# Patient Record
Sex: FEMALE | Race: WHITE | NOT HISPANIC OR LATINO | Employment: FULL TIME | URBAN - METROPOLITAN AREA
[De-identification: names, ages, dates, MRNs, and addresses within clinical notes are randomized per-mention and may not be internally consistent; named-entity substitution may affect disease eponyms.]

---

## 2023-03-08 LAB
ALANINE AMINOTRANSFERASE (SGPT) (U/L) IN SER/PLAS: 7 U/L (ref 7–45)
ALBUMIN (G/DL) IN SER/PLAS: 3.9 G/DL (ref 3.4–5)
ALKALINE PHOSPHATASE (U/L) IN SER/PLAS: 52 U/L (ref 33–110)
ANION GAP IN SER/PLAS: 12 MMOL/L (ref 10–20)
APPEARANCE, URINE: CLEAR
ASPARTATE AMINOTRANSFERASE (SGOT) (U/L) IN SER/PLAS: 15 U/L (ref 9–39)
BILIRUBIN TOTAL (MG/DL) IN SER/PLAS: 0.7 MG/DL (ref 0–1.2)
BILIRUBIN, URINE: NEGATIVE
BLOOD, URINE: NEGATIVE
CALCIUM (MG/DL) IN SER/PLAS: 8.8 MG/DL (ref 8.6–10.6)
CARBON DIOXIDE, TOTAL (MMOL/L) IN SER/PLAS: 27 MMOL/L (ref 21–32)
CHLORIDE (MMOL/L) IN SER/PLAS: 103 MMOL/L (ref 98–107)
CHOLESTEROL (MG/DL) IN SER/PLAS: 210 MG/DL (ref 0–199)
CHOLESTEROL IN HDL (MG/DL) IN SER/PLAS: 57.7 MG/DL
CHOLESTEROL/HDL RATIO: 3.6
COLOR, URINE: YELLOW
CREATININE (MG/DL) IN SER/PLAS: 0.85 MG/DL (ref 0.5–1.05)
ERYTHROCYTE DISTRIBUTION WIDTH (RATIO) BY AUTOMATED COUNT: 14.6 % (ref 11.5–14.5)
ERYTHROCYTE MEAN CORPUSCULAR HEMOGLOBIN CONCENTRATION (G/DL) BY AUTOMATED: 31.4 G/DL (ref 32–36)
ERYTHROCYTE MEAN CORPUSCULAR VOLUME (FL) BY AUTOMATED COUNT: 83 FL (ref 80–100)
ERYTHROCYTES (10*6/UL) IN BLOOD BY AUTOMATED COUNT: 5.05 X10E12/L (ref 4–5.2)
GFR FEMALE: 83 ML/MIN/1.73M2
GLUCOSE (MG/DL) IN SER/PLAS: 88 MG/DL (ref 74–99)
GLUCOSE, URINE: NEGATIVE MG/DL
HEMATOCRIT (%) IN BLOOD BY AUTOMATED COUNT: 42 % (ref 36–46)
HEMOGLOBIN (G/DL) IN BLOOD: 13.2 G/DL (ref 12–16)
KETONES, URINE: NEGATIVE MG/DL
LDL: 135 MG/DL (ref 0–99)
LEUKOCYTE ESTERASE, URINE: ABNORMAL
LEUKOCYTES (10*3/UL) IN BLOOD BY AUTOMATED COUNT: 5 X10E9/L (ref 4.4–11.3)
NITRITE, URINE: NEGATIVE
NRBC (PER 100 WBCS) BY AUTOMATED COUNT: 0 /100 WBC (ref 0–0)
PH, URINE: 7 (ref 5–8)
PLATELETS (10*3/UL) IN BLOOD AUTOMATED COUNT: 253 X10E9/L (ref 150–450)
POTASSIUM (MMOL/L) IN SER/PLAS: 4.5 MMOL/L (ref 3.5–5.3)
PROTEIN TOTAL: 6.3 G/DL (ref 6.4–8.2)
PROTEIN, URINE: NEGATIVE MG/DL
RBC, URINE: 1 /HPF (ref 0–5)
SODIUM (MMOL/L) IN SER/PLAS: 137 MMOL/L (ref 136–145)
SPECIFIC GRAVITY, URINE: 1.01 (ref 1–1.03)
SQUAMOUS EPITHELIAL CELLS, URINE: 3 /HPF
THYROTROPIN (MIU/L) IN SER/PLAS BY DETECTION LIMIT <= 0.05 MIU/L: 1.13 MIU/L (ref 0.44–3.98)
TRIGLYCERIDE (MG/DL) IN SER/PLAS: 85 MG/DL (ref 0–149)
UREA NITROGEN (MG/DL) IN SER/PLAS: 11 MG/DL (ref 6–23)
UROBILINOGEN, URINE: <2 MG/DL (ref 0–1.9)
VLDL: 17 MG/DL (ref 0–40)
WBC, URINE: 1 /HPF (ref 0–5)

## 2023-03-13 DIAGNOSIS — F52.0 HYPOACTIVE SEXUAL DESIRE DISORDER: Primary | ICD-10-CM

## 2023-03-13 PROBLEM — R00.2 PALPITATIONS: Status: ACTIVE | Noted: 2023-03-13

## 2023-03-13 PROBLEM — B34.9 NONSPECIFIC SYNDROME SUGGESTIVE OF VIRAL ILLNESS: Status: ACTIVE | Noted: 2023-03-13

## 2023-03-13 PROBLEM — M75.50 SUBACROMIAL BURSITIS: Status: ACTIVE | Noted: 2023-03-13

## 2023-03-13 PROBLEM — R87.610 ASCUS OF CERVIX WITH NEGATIVE HIGH RISK HPV: Status: ACTIVE | Noted: 2023-03-13

## 2023-03-13 PROBLEM — R00.0 INCREASED HEART RATE: Status: ACTIVE | Noted: 2023-03-13

## 2023-03-13 PROBLEM — R79.89 ELEVATED SERUM CREATININE: Status: ACTIVE | Noted: 2023-03-13

## 2023-03-13 PROBLEM — J30.2 SEASONAL ALLERGIES: Status: ACTIVE | Noted: 2023-03-13

## 2023-03-13 PROBLEM — R05.8 DRY COUGH: Status: ACTIVE | Noted: 2023-03-13

## 2023-03-13 PROBLEM — L50.9 URTICARIA: Status: ACTIVE | Noted: 2023-03-13

## 2023-03-13 PROBLEM — G47.9 SLEEP DISTURBANCE: Status: ACTIVE | Noted: 2023-03-13

## 2023-03-13 PROBLEM — R11.0 NAUSEA IN ADULT: Status: ACTIVE | Noted: 2023-03-13

## 2023-03-13 RX ORDER — VENLAFAXINE HYDROCHLORIDE 37.5 MG/1
37.5 CAPSULE, EXTENDED RELEASE ORAL DAILY
Qty: 90 CAPSULE | Refills: 1 | Status: SHIPPED | OUTPATIENT
Start: 2023-03-13 | End: 2024-03-05 | Stop reason: WASHOUT

## 2023-03-13 RX ORDER — VENLAFAXINE HYDROCHLORIDE 37.5 MG/1
37.5 CAPSULE, EXTENDED RELEASE ORAL DAILY
COMMUNITY
End: 2023-03-13 | Stop reason: SDUPTHER

## 2023-03-14 ENCOUNTER — TELEPHONE (OUTPATIENT)
Dept: PRIMARY CARE | Facility: CLINIC | Age: 53
End: 2023-03-14
Payer: COMMERCIAL

## 2023-03-14 DIAGNOSIS — E78.2 MIXED HYPERLIPIDEMIA: Primary | ICD-10-CM

## 2023-03-14 NOTE — TELEPHONE ENCOUNTER
Patient informed of lab results, she would like to proceed with Ct calcium score please order and she will schedule.

## 2023-03-14 NOTE — TELEPHONE ENCOUNTER
----- Message from Amalia FIELDS DO sent at 3/9/2023  8:27 AM EST -----  Her cholesterol is a bit high still; worse than last year.  See if she wants to do ct cardiac score for us to assess plaque formation in arteries.  This helps guide us to decide if medication management is needed. In meantime work on better diet and more exercise

## 2023-10-24 PROBLEM — R07.89 ATYPICAL CHEST PAIN: Status: ACTIVE | Noted: 2023-10-24

## 2023-10-27 ENCOUNTER — OFFICE VISIT (OUTPATIENT)
Dept: HEMATOLOGY/ONCOLOGY | Facility: CLINIC | Age: 53
End: 2023-10-27
Payer: COMMERCIAL

## 2023-10-27 VITALS
TEMPERATURE: 98.2 F | BODY MASS INDEX: 26 KG/M2 | HEART RATE: 81 BPM | SYSTOLIC BLOOD PRESSURE: 130 MMHG | DIASTOLIC BLOOD PRESSURE: 68 MMHG | WEIGHT: 151.46 LBS

## 2023-10-27 DIAGNOSIS — Z85.3 HISTORY OF RIGHT BREAST CANCER: Primary | ICD-10-CM

## 2023-10-27 PROCEDURE — 99213 OFFICE O/P EST LOW 20 MIN: CPT | Performed by: NURSE PRACTITIONER

## 2023-10-27 ASSESSMENT — PAIN SCALES - GENERAL: PAINLEVEL: 0-NO PAIN

## 2023-10-27 NOTE — PROGRESS NOTES
Oncology Follow-Up    Rosy Benjamin  [id number]       AJCC Edition: 7th (AJCC), Diagnosis Date: 12-May-2016, IA, T1a N0 M0      Oncology History    No history exists.   1. Left breast invasive ductal cancer, grade 3, ER moderate to strongly positive > 95%, MN moderately positive 80%, HER2 negative (1+), who is s/p  left total nipple sparing mastectomy with left axillary lymph node dissection on 5/12/16, which revealed a 4 mm tumor size in greatest dimension, negative margins for invasive ductal carcinoma with closest margin at 10 mm, and 0/2 lymph nodes involved.  2. Path staging: [id number], stage IA. She is also s/p right total nipple sparing mastectomy (prophylactic) with immediate implant reconstruction bilaterally.  3.  Oncotype Dx testing was requested at [facility] however could not be done likely due a very small tumor size.  4.  Pt sought a 2nd opinion here. Given T1a tumor, after review of NCCN guidelines, tumor board discussion and discussion with pt, adjuvant endocrine therapy alone recommended. Pt started on adjuvant tamoxifen 6/27/16.   5. Developed urticaria which cleared after a short course of steroids. Restarted tamoxifen with no further issues so far. Negative allergy test. Tolerating tamoxifen well.     6. Tamoxi      Subjective    Rosy presents for her routine oncology follow up. She quit her job and is very happy with her decision. She is doing fill in work with another company for 3 months. Perez states her cholesterol level is high and is now exercising more and more careful with her diet. Rosy continues to get monthly periods. She recently saw derm and had a pre-cancerous mole removed. Rosy rates her energy level as 7/10 and reports no distress. She Denies any unusual headaches, balance issues, depression, cough, shortness of breath, problems swallowing, changes in chest/breast area, abdominal pain, bone or muscle pain, vaginal bleeding, rectal bleeding, blood in the urine, vaginal dryness,  swelling arms or legs, new or unusual skin moles or lesions.         Objective      Vitals:    10/27/23 0936   BP: 130/68   Pulse: 81   Temp: 36.8 °C (98.2 °F)        Constitutional: Well developed, alert/oriented x3, no distress, cooperative   Eyes: clear sclera   ENMT: mucous membranes moist, no apparent lesions   Head/Neck: Neck supple, no bruits   Respiratory/Thorax: Patent airways, normal breath sounds with good chest expansion   Cardiovascular: Regular rate and rhythm, no murmurs, 2+ equal pulses of the extremities,   Gastrointestinal: Nondistended, soft, non-tender, no masses palpable, no organomegaly   Musculoskeletal: ROM intact, no joint swelling, normal strength   Extremities: normal extremities, no edema, cyanosis, contusions or wounds   Neurological: alert and oriented x3,  normal strength   Breast:  Bilateral mastectomy with implant reconstruction; no masses, nodules, skin changes, discharge.    Lymphatic: No significant lymphadenopathy   Psychological: Appropriate mood and behavior   Skin: Warm and dry, no lesions, no rashes      Physical Exam     Lab Results   Component Value Date    WBC 5.0 03/08/2023    HGB 13.2 03/08/2023    HCT 42.0 03/08/2023    MCV 83 03/08/2023     03/08/2023       Chemistry    Lab Results   Component Value Date/Time     03/08/2023 0820    K 4.5 03/08/2023 0820     03/08/2023 0820    CO2 27 03/08/2023 0820    BUN 11 03/08/2023 0820    CREATININE 0.85 03/08/2023 0820    Lab Results   Component Value Date/Time    CALCIUM 8.8 03/08/2023 0820    ALKPHOS 52 03/08/2023 0820    AST 15 03/08/2023 0820    ALT 7 03/08/2023 0820    BILITOT 0.7 03/08/2023 0820              Imaging:  N/A        Assessment/Plan    Rosy is a 53 year old woman with a hx of T1aN0 left breast cancer diagnosed in May 2016. She is s/p bilateral nipple sparing mastectomy with implant reconstruction. She completed 5 years of tamoxifen in September 2021. There is no evidence of recurrent disease  on today's exam.   Plan:  Exam is negative.  Encouraged monthly breast self exams, plant based diet, keep alcohol <3 drinks/week, exercise at least 2.5 hours/week.   We reviewed signs/symptoms of recurrence including new masses, new pigmented lesion, tugging or pulling of the skin, nipple discharge, rash in or around the chest area, or any new finding that doesn't resolve within a 2-3 weeks.  All of Rosy's questions/concerns were addressed.  Over 30 minutes of time was spent with this patient with >50% of the time with education, counseling, and coordination of care.  I will see Rosy back in one year. If her exam is negative, we will go to yearly visits. She is agreeable to this.  Diagnoses and all orders for this visit:  History of right breast cancer  -     Clinic Appointment Request Follow up; AYAZ GARCIA; Future          Ayaz Garcia, ANGELA-CNP

## 2023-10-27 NOTE — PATIENT INSTRUCTIONS
1. Exercise 2.5 hours per week; bone strengthening, cardio-vascular, resistance training.  2. Please do self breast exams monthly.  3. Keep alcohol under 3 drinks per week.  4. Sun safety - limit sun exposure from 11a-2p when its at its hottest, apply 15-30 sun block and re-apply every 1-2 hours if perspiring or swimming.  5. Eat a plant based diet, add in oily fishes such as mackerel, tuna, and salmon.  6. Get in at least 1,000 mg of calcium per day through diet or supplement for bone strength. Examples of foods higher in calcium are milk, yogurt, fruited yogurt, oranges, fortified orange juice, almonds, almond milk, broccoli, spinach, bok brenda, mustard greens, puddings, custards, ice cream, fortified cereals, bars, and crackers.   8. Please call the office if any new mass or rash in or around breast, or any uncontrolled symptoms that last over 2-3 weeks at [phone].  9. Your exam is negative, Rosy!   10. It was nice seeing you today! I will see you back  in one year. If your exam is negative we can go to as needed visits.   Thank you for choosing [facility] for your care.  Have a Happy, Healthy, Holiday Season!

## 2024-03-05 ENCOUNTER — OFFICE VISIT (OUTPATIENT)
Dept: PRIMARY CARE | Facility: CLINIC | Age: 54
End: 2024-03-05
Payer: COMMERCIAL

## 2024-03-05 VITALS
HEIGHT: 64 IN | SYSTOLIC BLOOD PRESSURE: 124 MMHG | RESPIRATION RATE: 16 BRPM | BODY MASS INDEX: 25.04 KG/M2 | WEIGHT: 146.7 LBS | HEART RATE: 100 BPM | DIASTOLIC BLOOD PRESSURE: 84 MMHG

## 2024-03-05 DIAGNOSIS — Z13.29 SCREENING FOR THYROID DISORDER: ICD-10-CM

## 2024-03-05 DIAGNOSIS — Z00.00 ROUTINE GENERAL MEDICAL EXAMINATION AT A HEALTH CARE FACILITY: Primary | ICD-10-CM

## 2024-03-05 DIAGNOSIS — C80.1 CANCER (MULTI): ICD-10-CM

## 2024-03-05 DIAGNOSIS — Z13.220 ENCOUNTER FOR SCREENING FOR LIPID DISORDER: ICD-10-CM

## 2024-03-05 DIAGNOSIS — E66.3 OVERWEIGHT WITH BODY MASS INDEX (BMI) OF 25 TO 25.9 IN ADULT: ICD-10-CM

## 2024-03-05 DIAGNOSIS — Z01.419 ENCOUNTER FOR GYNECOLOGICAL EXAMINATION WITHOUT ABNORMAL FINDING: ICD-10-CM

## 2024-03-05 DIAGNOSIS — J30.2 SEASONAL ALLERGIES: ICD-10-CM

## 2024-03-05 PROBLEM — B34.9 NONSPECIFIC SYNDROME SUGGESTIVE OF VIRAL ILLNESS: Status: RESOLVED | Noted: 2023-03-13 | Resolved: 2024-03-05

## 2024-03-05 PROBLEM — R07.89 ATYPICAL CHEST PAIN: Status: RESOLVED | Noted: 2023-10-24 | Resolved: 2024-03-05

## 2024-03-05 PROBLEM — R00.0 INCREASED HEART RATE: Status: RESOLVED | Noted: 2023-03-13 | Resolved: 2024-03-05

## 2024-03-05 PROBLEM — R11.0 NAUSEA IN ADULT: Status: RESOLVED | Noted: 2023-03-13 | Resolved: 2024-03-05

## 2024-03-05 PROBLEM — R87.610 ASCUS OF CERVIX WITH NEGATIVE HIGH RISK HPV: Status: RESOLVED | Noted: 2023-03-13 | Resolved: 2024-03-05

## 2024-03-05 PROBLEM — R00.2 PALPITATIONS: Status: RESOLVED | Noted: 2023-03-13 | Resolved: 2024-03-05

## 2024-03-05 LAB
ALBUMIN SERPL BCP-MCNC: 3.9 G/DL (ref 3.4–5)
ALP SERPL-CCNC: 64 U/L (ref 33–110)
ALT SERPL W P-5'-P-CCNC: 9 U/L (ref 7–45)
ANION GAP SERPL CALC-SCNC: 12 MMOL/L (ref 10–20)
AST SERPL W P-5'-P-CCNC: 17 U/L (ref 9–39)
BILIRUB SERPL-MCNC: 0.6 MG/DL (ref 0–1.2)
BUN SERPL-MCNC: 8 MG/DL (ref 6–23)
CALCIUM SERPL-MCNC: 9.2 MG/DL (ref 8.6–10.6)
CHLORIDE SERPL-SCNC: 102 MMOL/L (ref 98–107)
CHOLEST SERPL-MCNC: 209 MG/DL (ref 0–199)
CHOLESTEROL/HDL RATIO: 3.5
CO2 SERPL-SCNC: 29 MMOL/L (ref 21–32)
CREAT SERPL-MCNC: 0.93 MG/DL (ref 0.5–1.05)
EGFRCR SERPLBLD CKD-EPI 2021: 75 ML/MIN/1.73M*2
ERYTHROCYTE [DISTWIDTH] IN BLOOD BY AUTOMATED COUNT: 14.1 % (ref 11.5–14.5)
GLUCOSE SERPL-MCNC: 92 MG/DL (ref 74–99)
HCT VFR BLD AUTO: 44.7 % (ref 36–46)
HDLC SERPL-MCNC: 59.4 MG/DL
HGB BLD-MCNC: 14.4 G/DL (ref 12–16)
LDLC SERPL CALC-MCNC: 134 MG/DL
MCH RBC QN AUTO: 26.7 PG (ref 26–34)
MCHC RBC AUTO-ENTMCNC: 32.2 G/DL (ref 32–36)
MCV RBC AUTO: 83 FL (ref 80–100)
NON HDL CHOLESTEROL: 150 MG/DL (ref 0–149)
NRBC BLD-RTO: 0 /100 WBCS (ref 0–0)
PLATELET # BLD AUTO: 221 X10*3/UL (ref 150–450)
POTASSIUM SERPL-SCNC: 4 MMOL/L (ref 3.5–5.3)
PROT SERPL-MCNC: 6.8 G/DL (ref 6.4–8.2)
RBC # BLD AUTO: 5.4 X10*6/UL (ref 4–5.2)
SODIUM SERPL-SCNC: 139 MMOL/L (ref 136–145)
TRIGL SERPL-MCNC: 77 MG/DL (ref 0–149)
TSH SERPL-ACNC: 1 MIU/L (ref 0.44–3.98)
VLDL: 15 MG/DL (ref 0–40)
WBC # BLD AUTO: 6.5 X10*3/UL (ref 4.4–11.3)

## 2024-03-05 PROCEDURE — 84443 ASSAY THYROID STIM HORMONE: CPT

## 2024-03-05 PROCEDURE — 3008F BODY MASS INDEX DOCD: CPT | Performed by: INTERNAL MEDICINE

## 2024-03-05 PROCEDURE — 36415 COLL VENOUS BLD VENIPUNCTURE: CPT

## 2024-03-05 PROCEDURE — 88175 CYTOPATH C/V AUTO FLUID REDO: CPT

## 2024-03-05 PROCEDURE — 99396 PREV VISIT EST AGE 40-64: CPT | Performed by: INTERNAL MEDICINE

## 2024-03-05 PROCEDURE — 81003 URINALYSIS AUTO W/O SCOPE: CPT

## 2024-03-05 PROCEDURE — 85027 COMPLETE CBC AUTOMATED: CPT

## 2024-03-05 PROCEDURE — 80061 LIPID PANEL: CPT

## 2024-03-05 PROCEDURE — 1036F TOBACCO NON-USER: CPT | Performed by: INTERNAL MEDICINE

## 2024-03-05 PROCEDURE — 80053 COMPREHEN METABOLIC PANEL: CPT

## 2024-03-05 ASSESSMENT — ENCOUNTER SYMPTOMS
DIZZINESS: 0
WOUND: 0
AGITATION: 0
ANAL BLEEDING: 0
RHINORRHEA: 0
COLOR CHANGE: 0
DIARRHEA: 0
EYE ITCHING: 0
MYALGIAS: 0
DYSPHORIC MOOD: 0
JOINT SWELLING: 0
NAUSEA: 0
HALLUCINATIONS: 0
FEVER: 0
SINUS PAIN: 0
FREQUENCY: 0
EYE REDNESS: 0
SLEEP DISTURBANCE: 0
ABDOMINAL DISTENTION: 0
CHILLS: 0
NECK PAIN: 0
FLANK PAIN: 0
HEADACHES: 0
VOICE CHANGE: 0
PHOTOPHOBIA: 0
CONSTIPATION: 0
TREMORS: 0
CONFUSION: 0
FATIGUE: 0
ARTHRALGIAS: 1
FACIAL SWELLING: 0
UNEXPECTED WEIGHT CHANGE: 0
FACIAL ASYMMETRY: 0
COUGH: 1
SORE THROAT: 1
WEAKNESS: 0
NUMBNESS: 0
WHEEZING: 0
SEIZURES: 0
CHOKING: 0
POLYPHAGIA: 0
DECREASED CONCENTRATION: 0
DIFFICULTY URINATING: 0
DYSURIA: 0
APPETITE CHANGE: 0
STRIDOR: 0
ACTIVITY CHANGE: 0
CHEST TIGHTNESS: 0
DIAPHORESIS: 0
BLOOD IN STOOL: 0
EYE PAIN: 0
VOMITING: 0
ADENOPATHY: 0
LIGHT-HEADEDNESS: 0
NERVOUS/ANXIOUS: 0
NECK STIFFNESS: 0
TROUBLE SWALLOWING: 0
APNEA: 0
RECTAL PAIN: 0
SPEECH DIFFICULTY: 0
POLYDIPSIA: 0
SINUS PRESSURE: 1
SHORTNESS OF BREATH: 0
HEMATURIA: 0
ABDOMINAL PAIN: 0
BACK PAIN: 0
HYPERACTIVE: 0
PALPITATIONS: 0
EYE DISCHARGE: 0
BRUISES/BLEEDS EASILY: 0

## 2024-03-05 ASSESSMENT — PATIENT HEALTH QUESTIONNAIRE - PHQ9
SUM OF ALL RESPONSES TO PHQ9 QUESTIONS 1 AND 2: 0
2. FEELING DOWN, DEPRESSED OR HOPELESS: NOT AT ALL
1. LITTLE INTEREST OR PLEASURE IN DOING THINGS: NOT AT ALL

## 2024-03-05 NOTE — PROGRESS NOTES
Subjective   Patient ID: Rosy Benjamin is a 53 y.o. female who presents for Annual Exam.    HPI  Pt here for full physical.  She is working on weight loss-since 8/2023 she has been exercising 4 times a week and better diet.  She did not see any improvements with use of Venlafaxine so she is not taking this any longer.      She had normal pap/negative HPV in 3/2023.  She has her paps done here.  She is noting skipping periods.  She has some arias discharge at times otherwise.  She is sexually active.      She still sees oncologist yearly for history of breast CA.  She is s/p total mastectomy almost 7 years ago.  She was told likely will have MRI of area to check implants but she needs to see plastics regarding this.      She did cologuard in 2022 and it was negative.  No bowel changes or other GI issues.      She started two days ago with nasal congestion/sinus pressure, sore throat and cough.  She had a sick daughter as well with similar symptoms.      Review of Systems   Constitutional:  Negative for activity change, appetite change, chills, diaphoresis, fatigue, fever and unexpected weight change.   HENT:  Positive for congestion, sinus pressure and sore throat. Negative for dental problem, drooling, ear discharge, ear pain, facial swelling, hearing loss, mouth sores, nosebleeds, postnasal drip, rhinorrhea, sinus pain, sneezing, tinnitus, trouble swallowing and voice change.    Eyes:  Positive for visual disturbance (wears glasses-up to date on exam). Negative for photophobia, pain, discharge, redness and itching.   Respiratory:  Positive for cough. Negative for apnea, choking, chest tightness, shortness of breath, wheezing and stridor.    Cardiovascular:  Negative for chest pain, palpitations and leg swelling.   Gastrointestinal:  Negative for abdominal distention, abdominal pain, anal bleeding, blood in stool, constipation, diarrhea, nausea, rectal pain and vomiting.   Endocrine: Negative for cold  "intolerance, heat intolerance, polydipsia, polyphagia and polyuria.   Genitourinary:  Negative for decreased urine volume, difficulty urinating, dyspareunia, dysuria, enuresis, flank pain, frequency, genital sores, hematuria, menstrual problem, pelvic pain, urgency, vaginal bleeding, vaginal discharge and vaginal pain.   Musculoskeletal:  Positive for arthralgias (left knee). Negative for back pain, gait problem, joint swelling, myalgias, neck pain and neck stiffness.   Skin:  Negative for color change, pallor, rash and wound.   Allergic/Immunologic: Negative for environmental allergies, food allergies and immunocompromised state.   Neurological:  Negative for dizziness, tremors, seizures, syncope, facial asymmetry, speech difficulty, weakness, light-headedness, numbness and headaches.   Hematological:  Negative for adenopathy. Does not bruise/bleed easily.   Psychiatric/Behavioral:  Negative for agitation, behavioral problems, confusion, decreased concentration, dysphoric mood, hallucinations, self-injury, sleep disturbance and suicidal ideas. The patient is not nervous/anxious and is not hyperactive.        Objective   /84 (BP Location: Left arm, Patient Position: Sitting)   Pulse 100   Resp 16   Ht 1.626 m (5' 4\")   Wt 66.5 kg (146 lb 11.2 oz)   BMI 25.18 kg/m²    Physical Exam  Exam conducted with a chaperone present.   Constitutional:       Appearance: Normal appearance.   HENT:      Head: Normocephalic and atraumatic.      Right Ear: Tympanic membrane, ear canal and external ear normal. There is no impacted cerumen.      Left Ear: Tympanic membrane, ear canal and external ear normal. There is no impacted cerumen.      Nose: Nose normal. No congestion or rhinorrhea.      Mouth/Throat:      Mouth: Mucous membranes are moist.      Pharynx: Oropharynx is clear. No oropharyngeal exudate or posterior oropharyngeal erythema.   Eyes:      Extraocular Movements: Extraocular movements intact.      " Conjunctiva/sclera: Conjunctivae normal.      Pupils: Pupils are equal, round, and reactive to light.   Neck:      Vascular: No carotid bruit.   Cardiovascular:      Rate and Rhythm: Normal rate and regular rhythm.      Pulses: Normal pulses.      Heart sounds: Normal heart sounds. No murmur heard.  Pulmonary:      Effort: Pulmonary effort is normal. No respiratory distress.      Breath sounds: Normal breath sounds. No wheezing, rhonchi or rales.   Abdominal:      General: Abdomen is flat. Bowel sounds are normal. There is no distension.      Palpations: Abdomen is soft.      Tenderness: There is no abdominal tenderness.      Hernia: No hernia is present.   Genitourinary:     General: Normal vulva.      Vagina: Normal.      Cervix: Erythema (minor redness at os) present.      Uterus: Normal.       Adnexa: Right adnexa normal and left adnexa normal.      Rectum: Normal.   Musculoskeletal:         General: No swelling or tenderness. Normal range of motion.      Cervical back: Normal range of motion and neck supple.      Right lower leg: No edema.      Left lower leg: No edema.   Lymphadenopathy:      Cervical: No cervical adenopathy.   Skin:     General: Skin is warm and dry.      Findings: No lesion or rash.   Neurological:      General: No focal deficit present.      Mental Status: She is alert and oriented to person, place, and time.      Cranial Nerves: No cranial nerve deficit.      Sensory: No sensory deficit.      Motor: No weakness.   Psychiatric:         Mood and Affect: Mood normal.         Behavior: Behavior normal.         Thought Content: Thought content normal.         Judgment: Judgment normal.         Assessment/Plan   Problem List Items Addressed This Visit       Seasonal allergies     Has some congestion and cough at present  Likely from variable weather  Can take OTC remedies to help symptom control          Cancer (CMS/[facility])     S/p total mastectomy with reconstruction   Sees oncology yearly             Overweight with body mass index (BMI) of 25 to 25.9 in adult     She has increase exercise and is working on better diet            Other Visit Diagnoses       Routine general medical examination at a health care facility    -  Primary    Relevant Orders    Comprehensive Metabolic Panel    CBC    Urinalysis with Reflex Microscopic    Follow Up In Primary Care - Health Maintenance    Screening for thyroid disorder        Relevant Orders    TSH with reflex to Free T4 if abnormal    Encounter for screening for lipid disorder        Relevant Orders    Lipid Panel    Encounter for gynecological examination without abnormal finding        Relevant Orders    THINPREP PAP TEST

## 2024-03-05 NOTE — PATIENT INSTRUCTIONS
We did blood work today and will call if anything is abnormal  Pap was done today and will call only if abnormal  See oncology yearly as directed for breast exam  Continue healthy diet and exercise regularly  Monitor cold like symptoms-may be seasonal allergies-can use otc meds to help control symptoms  Follow up here in 1 year

## 2024-03-05 NOTE — ASSESSMENT & PLAN NOTE
Has some congestion and cough at present  Likely from variable weather  Can take OTC remedies to help symptom control

## 2024-03-06 LAB
APPEARANCE UR: CLEAR
BILIRUB UR STRIP.AUTO-MCNC: NEGATIVE MG/DL
COLOR UR: NORMAL
GLUCOSE UR STRIP.AUTO-MCNC: NORMAL MG/DL
KETONES UR STRIP.AUTO-MCNC: NEGATIVE MG/DL
LEUKOCYTE ESTERASE UR QL STRIP.AUTO: NEGATIVE
NITRITE UR QL STRIP.AUTO: NEGATIVE
PH UR STRIP.AUTO: 6 [PH]
PROT UR STRIP.AUTO-MCNC: NEGATIVE MG/DL
RBC # UR STRIP.AUTO: NEGATIVE /UL
SP GR UR STRIP.AUTO: 1.01
UROBILINOGEN UR STRIP.AUTO-MCNC: NORMAL MG/DL

## 2024-03-19 LAB
CYTOLOGY CMNT CVX/VAG CYTO-IMP: NORMAL
LAB AP HPV GENOTYPE QUESTION: YES
LAB AP HPV HR: NORMAL
LABORATORY COMMENT REPORT: NORMAL
PATH REPORT.TOTAL CANCER: NORMAL

## 2024-10-29 PROBLEM — Z92.29 HISTORY OF TAMOXIFEN THERAPY: Status: ACTIVE | Noted: 2024-10-29

## 2024-10-29 PROBLEM — Z85.3 HISTORY OF RIGHT BREAST CANCER: Status: ACTIVE | Noted: 2024-10-29

## 2024-10-29 PROBLEM — Z90.13 HISTORY OF BILATERAL MASTECTOMY: Status: ACTIVE | Noted: 2024-10-29

## 2024-10-30 ENCOUNTER — OFFICE VISIT (OUTPATIENT)
Dept: HEMATOLOGY/ONCOLOGY | Facility: CLINIC | Age: 54
End: 2024-10-30
Payer: COMMERCIAL

## 2024-10-30 VITALS
HEART RATE: 81 BPM | WEIGHT: 146.5 LBS | DIASTOLIC BLOOD PRESSURE: 74 MMHG | SYSTOLIC BLOOD PRESSURE: 113 MMHG | BODY MASS INDEX: 25.15 KG/M2 | TEMPERATURE: 97.8 F

## 2024-10-30 DIAGNOSIS — Z85.3 HISTORY OF RIGHT BREAST CANCER: Primary | ICD-10-CM

## 2024-10-30 DIAGNOSIS — Z90.13 HISTORY OF BILATERAL MASTECTOMY: ICD-10-CM

## 2024-10-30 DIAGNOSIS — Z92.29 HISTORY OF TAMOXIFEN THERAPY: ICD-10-CM

## 2024-10-30 DIAGNOSIS — N95.1 MENOPAUSE SYNDROME: ICD-10-CM

## 2024-10-30 PROCEDURE — 99214 OFFICE O/P EST MOD 30 MIN: CPT | Performed by: NURSE PRACTITIONER

## 2024-10-30 ASSESSMENT — PAIN SCALES - GENERAL: PAINLEVEL_OUTOF10: 0-NO PAIN

## 2025-02-24 ENCOUNTER — OFFICE VISIT (OUTPATIENT)
Dept: URGENT CARE | Age: 55
End: 2025-02-24
Payer: COMMERCIAL

## 2025-02-24 VITALS
OXYGEN SATURATION: 99 % | SYSTOLIC BLOOD PRESSURE: 135 MMHG | RESPIRATION RATE: 16 BRPM | WEIGHT: 145 LBS | TEMPERATURE: 97.4 F | DIASTOLIC BLOOD PRESSURE: 88 MMHG | BODY MASS INDEX: 24.89 KG/M2 | HEART RATE: 84 BPM

## 2025-02-24 DIAGNOSIS — J06.9 UPPER RESPIRATORY TRACT INFECTION, UNSPECIFIED TYPE: Primary | ICD-10-CM

## 2025-02-24 PROCEDURE — 1036F TOBACCO NON-USER: CPT | Performed by: STUDENT IN AN ORGANIZED HEALTH CARE EDUCATION/TRAINING PROGRAM

## 2025-02-24 PROCEDURE — 99213 OFFICE O/P EST LOW 20 MIN: CPT | Performed by: STUDENT IN AN ORGANIZED HEALTH CARE EDUCATION/TRAINING PROGRAM

## 2025-02-24 RX ORDER — AMOXICILLIN 875 MG/1
875 TABLET, FILM COATED ORAL 2 TIMES DAILY
Qty: 20 TABLET | Refills: 0 | Status: SHIPPED | OUTPATIENT
Start: 2025-02-24 | End: 2025-03-06

## 2025-02-24 ASSESSMENT — ENCOUNTER SYMPTOMS
GASTROINTESTINAL NEGATIVE: 1
SINUS PRESSURE: 1
COUGH: 0
SINUS PAIN: 1
FEVER: 0
SINUS COMPLAINT: 1
CARDIOVASCULAR NEGATIVE: 1

## 2025-02-24 ASSESSMENT — PAIN SCALES - GENERAL: PAINLEVEL_OUTOF10: 6

## 2025-02-24 NOTE — PROGRESS NOTES
Subjective   Patient ID: Rosy Benjamin is a 54 y.o. female. They present today with a chief complaint of Sinus Problem (ST and chills last week, now sinus congestion/pain/drainage. ).    History of Present Illness    Sinus Problem  Associated symptoms: congestion and ear pain    Associated symptoms: no cough and no fever      Patient presents to the urgent care for a chief complaint of sore throat and postnasal drip along with sinus pressure and congestion along with right ear pressure, no previous URI several weeks prior no vomiting or diarrhea no report of respiratory distress, patient states that she does have a cough but due to throat irritation has taken Mucinex and over-the-counter cough and cold syrup to no resolve prompting visit to the urgent care  Past Medical History  Allergies as of 02/24/2025    (No Known Allergies)       (Not in a hospital admission)       Past Medical History:   Diagnosis Date    Acute frontal sinusitis, unspecified 12/19/2019    Acute non-recurrent frontal sinusitis    Acute maxillary sinusitis, unspecified 12/19/2019    Acute maxillary sinusitis, recurrence not specified    Acute upper respiratory infection, unspecified 02/15/2022    Viral URI with cough    Anxiety disorder, unspecified 12/05/2016    Acute anxiety    ASCUS of cervix with negative high risk HPV 03/13/2023    Encounter for nonprocreative screening for genetic disease carrier status     BRCA negative    Personal history of diseases of the blood and blood-forming organs and certain disorders involving the immune mechanism     History of anemia    Personal history of malignant neoplasm of breast 02/12/2021    History of malignant neoplasm of breast    Personal history of other complications of pregnancy, childbirth and the puerperium     History of ectopic pregnancy    Personal history of other diseases of the nervous system and sense organs 12/19/2019    History of viral conjunctivitis    Personal history of other  diseases of the respiratory system     History of bacterial sinusitis    Personal history of other infectious and parasitic diseases     History of chickenpox       Past Surgical History:   Procedure Laterality Date    DILATION AND CURETTAGE OF UTERUS  04/03/2014    Dilation And Curettage    MASTECTOMY Bilateral 03/29/2018    Breast Surgery Mastectomy    OTHER SURGICAL HISTORY  04/03/2014    Laparoscopy With Excision Of Ectopic Pregnancy    OTHER SURGICAL HISTORY  04/03/2014    Hysteroscopy    OTHER SURGICAL HISTORY  04/03/2014    Gynecologic Services In Vitro Fertilization    OTHER SURGICAL HISTORY  03/29/2018    Breast Surgery Reconstruction Bilateral    OTHER SURGICAL HISTORY  03/29/2018    Uterine Surgery; scar tissue removal        reports that she has never smoked. She has never used smokeless tobacco. She reports current alcohol use. She reports that she does not currently use drugs.    Review of Systems  Review of Systems   Constitutional:  Negative for fever.   HENT:  Positive for congestion, ear pain, postnasal drip, sinus pressure and sinus pain. Negative for ear discharge.    Respiratory:  Negative for cough.    Cardiovascular: Negative.    Gastrointestinal: Negative.                                   Objective    Vitals:    02/24/25 0925   BP: 135/88   Pulse: 84   Resp: 16   Temp: 36.3 °C (97.4 °F)   SpO2: 99%   Weight: 65.8 kg (145 lb)     Patient's last menstrual period was 08/01/2024 (within months).    Physical Exam  Vitals and nursing note reviewed.   Constitutional:       General: She is not in acute distress.     Appearance: Normal appearance. She is not ill-appearing, toxic-appearing or diaphoretic.   HENT:      Head: Normocephalic and atraumatic.      Right Ear: Tympanic membrane normal. There is no impacted cerumen.      Left Ear: Tympanic membrane normal. There is no impacted cerumen.      Nose: Congestion present.      Mouth/Throat:      Mouth: Mucous membranes are moist.      Pharynx:  Posterior oropharyngeal erythema present. No oropharyngeal exudate.   Cardiovascular:      Rate and Rhythm: Normal rate and regular rhythm.      Pulses: Normal pulses.      Heart sounds: Normal heart sounds.   Pulmonary:      Effort: Pulmonary effort is normal. No respiratory distress.      Breath sounds: Normal breath sounds. No stridor. No wheezing, rhonchi or rales.   Lymphadenopathy:      Cervical: Cervical adenopathy present.   Skin:     Findings: No rash.   Neurological:      General: No focal deficit present.      Mental Status: She is alert and oriented to person, place, and time.   Psychiatric:         Mood and Affect: Mood normal.         Behavior: Behavior normal.     Follow    Procedures    Point of Care Test & Imaging Results from this visit  No results found for this visit on 02/24/25.   No results found.    Diagnostic study results (if any) were reviewed by Jacob Noonan PA-C.    Assessment/Plan   Allergies, medications, history, and pertinent labs/EKGs/Imaging reviewed by Jacob Noonan PA-C.     Medical Decision Making  Patient will be placed on amoxicillin for suspected sinusitis due to symptomology and duration of symptoms if no resolution regression of symptoms patient is to follow-up with primary care or may return to urgent care for reevaluation.  Discharge emergent care A+O x 4 stable condition no signs of distress    Orders and Diagnoses  Diagnoses and all orders for this visit:  Upper respiratory tract infection, unspecified type  -     amoxicillin (Amoxil) 875 mg tablet; Take 1 tablet (875 mg) by mouth 2 times a day for 10 days.      Medical Admin Record      Patient disposition: Home    Electronically signed by Jacob Noonan PA-C  9:46 AM

## 2025-03-10 ENCOUNTER — APPOINTMENT (OUTPATIENT)
Dept: PRIMARY CARE | Facility: CLINIC | Age: 55
End: 2025-03-10
Payer: COMMERCIAL

## 2025-03-10 VITALS
SYSTOLIC BLOOD PRESSURE: 117 MMHG | DIASTOLIC BLOOD PRESSURE: 78 MMHG | RESPIRATION RATE: 14 BRPM | BODY MASS INDEX: 25.7 KG/M2 | WEIGHT: 150.5 LBS | HEART RATE: 89 BPM | OXYGEN SATURATION: 98 % | HEIGHT: 64 IN | TEMPERATURE: 98.3 F

## 2025-03-10 DIAGNOSIS — Z12.11 SCREENING FOR COLORECTAL CANCER: ICD-10-CM

## 2025-03-10 DIAGNOSIS — E66.3 OVERWEIGHT WITH BODY MASS INDEX (BMI) OF 26 TO 26.9 IN ADULT: ICD-10-CM

## 2025-03-10 DIAGNOSIS — Z85.3 HISTORY OF RIGHT BREAST CANCER: ICD-10-CM

## 2025-03-10 DIAGNOSIS — Z13.29 SCREENING FOR THYROID DISORDER: ICD-10-CM

## 2025-03-10 DIAGNOSIS — Z00.00 ROUTINE GENERAL MEDICAL EXAMINATION AT A HEALTH CARE FACILITY: Primary | ICD-10-CM

## 2025-03-10 DIAGNOSIS — Z12.12 SCREENING FOR COLORECTAL CANCER: ICD-10-CM

## 2025-03-10 DIAGNOSIS — Z92.29 HISTORY OF MMR VACCINATION: ICD-10-CM

## 2025-03-10 DIAGNOSIS — Z13.220 SCREENING FOR LIPID DISORDERS: ICD-10-CM

## 2025-03-10 DIAGNOSIS — N95.1 SYMPTOMS, SUCH AS FLUSHING, SLEEPLESSNESS, HEADACHE, LACK OF CONCENTRATION, ASSOCIATED WITH THE MENOPAUSE: ICD-10-CM

## 2025-03-10 PROCEDURE — 1036F TOBACCO NON-USER: CPT | Performed by: INTERNAL MEDICINE

## 2025-03-10 PROCEDURE — 3008F BODY MASS INDEX DOCD: CPT | Performed by: INTERNAL MEDICINE

## 2025-03-10 PROCEDURE — 99396 PREV VISIT EST AGE 40-64: CPT | Performed by: INTERNAL MEDICINE

## 2025-03-10 RX ORDER — VENLAFAXINE HYDROCHLORIDE 37.5 MG/1
37.5 CAPSULE, EXTENDED RELEASE ORAL DAILY
Qty: 30 CAPSULE | Refills: 1 | Status: SHIPPED | OUTPATIENT
Start: 2025-03-10 | End: 2025-05-09

## 2025-03-10 ASSESSMENT — ENCOUNTER SYMPTOMS
EYE REDNESS: 0
SINUS PRESSURE: 0
HALLUCINATIONS: 0
DECREASED CONCENTRATION: 0
HEADACHES: 0
CHILLS: 0
SINUS PAIN: 0
ARTHRALGIAS: 0
CONFUSION: 0
RHINORRHEA: 0
DIZZINESS: 0
DYSURIA: 0
BACK PAIN: 0
WEAKNESS: 0
EYE DISCHARGE: 0
DIAPHORESIS: 0
BRUISES/BLEEDS EASILY: 0
FATIGUE: 0
NECK PAIN: 0
NAUSEA: 0
JOINT SWELLING: 0
SEIZURES: 0
WHEEZING: 0
ANAL BLEEDING: 0
RECTAL PAIN: 0
FREQUENCY: 0
MYALGIAS: 0
DIFFICULTY URINATING: 0
FEVER: 0
EYE ITCHING: 0
POLYDIPSIA: 0
TROUBLE SWALLOWING: 0
AGITATION: 0
HYPERACTIVE: 0
VOMITING: 0
UNEXPECTED WEIGHT CHANGE: 0
NECK STIFFNESS: 0
SPEECH DIFFICULTY: 0
ABDOMINAL DISTENTION: 0
NERVOUS/ANXIOUS: 0
APNEA: 0
FACIAL ASYMMETRY: 0
HEMATURIA: 0
SLEEP DISTURBANCE: 0
WOUND: 0
COUGH: 1
BLOOD IN STOOL: 0
DIARRHEA: 0
CONSTIPATION: 0
COLOR CHANGE: 0
VOICE CHANGE: 0
TREMORS: 0
NUMBNESS: 0
LIGHT-HEADEDNESS: 0
STRIDOR: 0
POLYPHAGIA: 0
EYE PAIN: 0
ABDOMINAL PAIN: 0
SHORTNESS OF BREATH: 0
PALPITATIONS: 0
APPETITE CHANGE: 0
FLANK PAIN: 0
DYSPHORIC MOOD: 0
SORE THROAT: 0
CHEST TIGHTNESS: 0
PHOTOPHOBIA: 0
ADENOPATHY: 0
FACIAL SWELLING: 0
CHOKING: 0
ACTIVITY CHANGE: 0

## 2025-03-10 ASSESSMENT — PATIENT HEALTH QUESTIONNAIRE - PHQ9
SUM OF ALL RESPONSES TO PHQ9 QUESTIONS 1 AND 2: 0
1. LITTLE INTEREST OR PLEASURE IN DOING THINGS: NOT AT ALL
2. FEELING DOWN, DEPRESSED OR HOPELESS: NOT AT ALL

## 2025-03-10 NOTE — PATIENT INSTRUCTIONS
Do recommend the 2 part Shingrix vaccine when ready-can cause flu like syndrome for 1-2 days in some patient's   Start Venlafaxine (Effexor) 37.5 mg one a day for menopause symptoms; may note mild side effects initially but they should resolve as you continue use  Get blood work done today and we will call with results  We are testing titers for MMR immunity  Continue healthy diet-lean protein,fish, veggies, lower carb/lower sugar  Exercise regularly-goal is 150 minutes/week of moderate activity  Call and schedule your colonoscopy-order is in  Follow up here in 1 year-sooner if you want GYN/breast exam

## 2025-03-10 NOTE — ASSESSMENT & PLAN NOTE
Pt is 9 years out-last saw oncologist in 10/2024 who has now turned her over to use to do breast exams  S/p mastectomy so no mammogram needed only breast exam

## 2025-03-10 NOTE — PROGRESS NOTES
Subjective   Patient ID: Rosy Benjamin is a 54 y.o. female who presents for Annual Exam.    HPI    Pt here for physical.  She is up a bit in weight from last year.  She took a new job which has been a bit stressful.  She started some counseling of late.  She is noting menopause symptoms of irritability and sleep issues.  She has a visit with an NP to discuss this (non hormonal due to history of breast CA) but cannot be seen until June.    She has been sick recently with URI/cough.  She did course of Augmentin.      She had normal pap in 3/2024 here.    Cologuard was negative in 2022.  She is due again.  No bowel issues.      She saw NP Miles for her historfy of left breast invasive ductal cancer back in 2016.  She is now turned over to just see PCP/GYN. S/p bilateral mastectomy with reconstruction.      She sees Derm regularly.  Did have basal cell CA of skin lesion on right hip.  This occurred in summer.      Review of Systems   Constitutional:  Negative for activity change, appetite change, chills, diaphoresis, fatigue, fever and unexpected weight change.   HENT:  Positive for postnasal drip. Negative for congestion, dental problem, drooling, ear discharge, ear pain, facial swelling, hearing loss, mouth sores, nosebleeds, rhinorrhea, sinus pressure, sinus pain, sneezing, sore throat, tinnitus, trouble swallowing and voice change.    Eyes:  Negative for photophobia, pain, discharge, redness, itching and visual disturbance (wears glasses-up to date on exam).   Respiratory:  Positive for cough. Negative for apnea, choking, chest tightness, shortness of breath, wheezing and stridor.    Cardiovascular:  Negative for chest pain, palpitations and leg swelling.   Gastrointestinal:  Negative for abdominal distention, abdominal pain, anal bleeding, blood in stool, constipation, diarrhea, nausea, rectal pain and vomiting.   Endocrine: Negative for cold intolerance, heat intolerance, polydipsia, polyphagia and polyuria.  "  Genitourinary:  Negative for decreased urine volume, difficulty urinating, dyspareunia, dysuria, enuresis, flank pain, frequency, genital sores, hematuria, menstrual problem, pelvic pain, urgency, vaginal bleeding, vaginal discharge and vaginal pain.   Musculoskeletal:  Negative for arthralgias, back pain, gait problem, joint swelling, myalgias, neck pain and neck stiffness.   Skin:  Negative for color change, pallor, rash and wound.   Allergic/Immunologic: Negative for environmental allergies.   Neurological:  Negative for dizziness, tremors, seizures, syncope, facial asymmetry, speech difficulty, weakness, light-headedness, numbness and headaches.   Hematological:  Negative for adenopathy. Does not bruise/bleed easily.   Psychiatric/Behavioral:  Negative for agitation, behavioral problems, confusion, decreased concentration, dysphoric mood, hallucinations, self-injury, sleep disturbance and suicidal ideas. The patient is not nervous/anxious and is not hyperactive.         +irritability        Objective   /78 (BP Location: Right arm, Patient Position: Sitting)   Pulse 89   Temp 36.8 °C (98.3 °F) (Oral)   Resp 14   Ht 1.619 m (5' 3.75\")   Wt 68.3 kg (150 lb 8 oz)   LMP 08/01/2024 (Within Months)   SpO2 98%   BMI 26.04 kg/m²      Physical Exam  Constitutional:       Appearance: Normal appearance.   HENT:      Head: Normocephalic and atraumatic.      Right Ear: Tympanic membrane, ear canal and external ear normal. There is no impacted cerumen.      Left Ear: Tympanic membrane, ear canal and external ear normal. There is no impacted cerumen.      Nose: Nose normal. No congestion or rhinorrhea.      Mouth/Throat:      Mouth: Mucous membranes are moist.      Pharynx: Oropharynx is clear. No oropharyngeal exudate or posterior oropharyngeal erythema.   Eyes:      Extraocular Movements: Extraocular movements intact.      Conjunctiva/sclera: Conjunctivae normal.      Pupils: Pupils are equal, round, and " reactive to light.   Neck:      Vascular: No carotid bruit.   Cardiovascular:      Rate and Rhythm: Normal rate and regular rhythm.      Pulses: Normal pulses.      Heart sounds: Normal heart sounds. No murmur heard.  Pulmonary:      Effort: Pulmonary effort is normal. No respiratory distress.      Breath sounds: Normal breath sounds. No wheezing, rhonchi or rales.   Abdominal:      General: Abdomen is flat. Bowel sounds are normal. There is no distension.      Palpations: Abdomen is soft.      Tenderness: There is no abdominal tenderness.      Hernia: No hernia is present.   Musculoskeletal:         General: No swelling or tenderness. Normal range of motion.      Cervical back: Normal range of motion and neck supple.      Right lower leg: No edema.      Left lower leg: No edema.   Lymphadenopathy:      Cervical: No cervical adenopathy.   Skin:     General: Skin is warm and dry.      Findings: No lesion or rash.   Neurological:      General: No focal deficit present.      Mental Status: She is alert and oriented to person, place, and time.      Cranial Nerves: No cranial nerve deficit.      Sensory: No sensory deficit.      Motor: No weakness.   Psychiatric:         Mood and Affect: Mood normal.         Behavior: Behavior normal.         Thought Content: Thought content normal.         Judgment: Judgment normal.         Assessment/Plan   Problem List Items Addressed This Visit       Overweight with body mass index (BMI) of 26 to 26.9 in adult     Continue healthy diet and exercise regularly          History of right breast cancer     Pt is 9 years out-last saw oncologist in 10/2024 who has now turned her over to use to do breast exams  S/p mastectomy so no mammogram needed only breast exam          Symptoms, such as flushing, sleeplessness, headache, lack of concentration, associated with the menopause     Pt with history of breast CA so cannot do HRT  Will start low dose Effexor and see if this helps manage  irritability/sleep issues  Went over possible side effects initially  She has appt with an NP who treats menopausal symptoms in patient's who cannot take HRT but not till June  She will call with update          Relevant Medications    venlafaxine XR (Effexor-XR) 37.5 mg 24 hr capsule     Other Visit Diagnoses       Routine general medical examination at a health care facility    -  Primary    Relevant Orders    Comprehensive Metabolic Panel    CBC    Follow Up In Primary Care - Health Maintenance    Screening for colorectal cancer        Relevant Orders    Colonoscopy Screening; Average Risk Patient    Screening for lipid disorders        Relevant Orders    Lipid Panel    Screening for thyroid disorder        Relevant Orders    TSH with reflex to Free T4 if abnormal    History of MMR vaccination        Relevant Orders    Measles (Rubeola) Antibody, IgG    Mumps Antibody, IgG    Rubella antibody, IgG

## 2025-03-10 NOTE — ASSESSMENT & PLAN NOTE
Pt with history of breast CA so cannot do HRT  Will start low dose Effexor and see if this helps manage irritability/sleep issues  Went over possible side effects initially  She has appt with an NP who treats menopausal symptoms in patient's who cannot take HRT but not till June  She will call with update

## 2025-03-11 ENCOUNTER — TELEPHONE (OUTPATIENT)
Dept: PRIMARY CARE | Facility: CLINIC | Age: 55
End: 2025-03-11
Payer: COMMERCIAL

## 2025-03-11 DIAGNOSIS — E78.2 MIXED HYPERLIPIDEMIA: Primary | ICD-10-CM

## 2025-03-11 LAB
ALBUMIN SERPL-MCNC: 3.9 G/DL (ref 3.6–5.1)
ALP SERPL-CCNC: 58 U/L (ref 37–153)
ALT SERPL-CCNC: 11 U/L (ref 6–29)
ANION GAP SERPL CALCULATED.4IONS-SCNC: 12 MMOL/L (CALC) (ref 7–17)
AST SERPL-CCNC: 18 U/L (ref 10–35)
BILIRUB SERPL-MCNC: 0.6 MG/DL (ref 0.2–1.2)
BUN SERPL-MCNC: 13 MG/DL (ref 7–25)
CALCIUM SERPL-MCNC: 8.8 MG/DL (ref 8.6–10.4)
CHLORIDE SERPL-SCNC: 105 MMOL/L (ref 98–110)
CHOLEST SERPL-MCNC: 228 MG/DL
CHOLEST/HDLC SERPL: 4.1 (CALC)
CO2 SERPL-SCNC: 23 MMOL/L (ref 20–32)
CREAT SERPL-MCNC: 0.97 MG/DL (ref 0.5–1.03)
EGFRCR SERPLBLD CKD-EPI 2021: 70 ML/MIN/1.73M2
ERYTHROCYTE [DISTWIDTH] IN BLOOD BY AUTOMATED COUNT: 13.4 % (ref 11–15)
GLUCOSE SERPL-MCNC: 84 MG/DL (ref 65–99)
HCT VFR BLD AUTO: 42.7 % (ref 35–45)
HDLC SERPL-MCNC: 56 MG/DL
HGB BLD-MCNC: 13.8 G/DL (ref 11.7–15.5)
LDLC SERPL CALC-MCNC: 150 MG/DL (CALC)
MCH RBC QN AUTO: 26.8 PG (ref 27–33)
MCHC RBC AUTO-ENTMCNC: 32.3 G/DL (ref 32–36)
MCV RBC AUTO: 83.1 FL (ref 80–100)
NONHDLC SERPL-MCNC: 172 MG/DL (CALC)
PLATELET # BLD AUTO: 269 THOUSAND/UL (ref 140–400)
PMV BLD REES-ECKER: 11.5 FL (ref 7.5–12.5)
POTASSIUM SERPL-SCNC: 4.5 MMOL/L (ref 3.5–5.3)
PROT SERPL-MCNC: 6.8 G/DL (ref 6.1–8.1)
RBC # BLD AUTO: 5.14 MILLION/UL (ref 3.8–5.1)
SODIUM SERPL-SCNC: 140 MMOL/L (ref 135–146)
TRIGL SERPL-MCNC: 108 MG/DL
TSH SERPL-ACNC: 1.42 MIU/L
WBC # BLD AUTO: 3.8 THOUSAND/UL (ref 3.8–10.8)

## 2025-03-11 NOTE — TELEPHONE ENCOUNTER
Called and spoke with patient regarding results- patient is interested in doing ct cardiac score. Please place order and I will inform patient once it is in. I encouraged patient to work on healthy diet and exercising regularly. No other questions or concerns noted right now.

## 2025-03-11 NOTE — TELEPHONE ENCOUNTER
----- Message from Amalia Mena sent at 3/11/2025  6:52 AM EDT -----  Cholesterol got worse so recommend we do CT cardiac score to assess for any plaque formation in arteries-this will help us to determine if we need to start medication or if we can continue to work aggressively on better diet-lean protein, fish, veggies, healthy fats/oils and lower trans saturated fats  Exercise regularly with goal of 150 minutes/week

## 2025-03-12 LAB
MEV IGG SER IA-ACNC: 14.5 AU/ML
MUV IGG SER IA-ACNC: 15.5 AU/ML
RUBV IGG SERPL IA-ACNC: 1.5 INDEX

## 2025-03-13 ENCOUNTER — TELEPHONE (OUTPATIENT)
Dept: PRIMARY CARE | Facility: CLINIC | Age: 55
End: 2025-03-13
Payer: COMMERCIAL

## 2025-03-13 NOTE — TELEPHONE ENCOUNTER
Patient informed on results- she understands and has no other questions or concerns noted right now.

## 2025-03-13 NOTE — TELEPHONE ENCOUNTER
----- Message from Amalia Mena sent at 3/12/2025  2:46 PM EDT -----  Her measles was equivocal and other two were consistent with immunity  Since it is a combine booster not sure she should get additional vaccine at this time    Will ask reinaldo schedule for Thursday the 3rd at 1030 per dr. lennon

## 2025-04-11 ENCOUNTER — TELEPHONE (OUTPATIENT)
Dept: PRIMARY CARE | Facility: CLINIC | Age: 55
End: 2025-04-11
Payer: COMMERCIAL

## 2025-06-16 ENCOUNTER — APPOINTMENT (OUTPATIENT)
Dept: OBSTETRICS AND GYNECOLOGY | Facility: CLINIC | Age: 55
End: 2025-06-16
Payer: COMMERCIAL

## 2025-06-16 VITALS
SYSTOLIC BLOOD PRESSURE: 118 MMHG | BODY MASS INDEX: 27 KG/M2 | HEIGHT: 63 IN | DIASTOLIC BLOOD PRESSURE: 70 MMHG | WEIGHT: 152.4 LBS

## 2025-06-16 DIAGNOSIS — N95.0 PMB (POSTMENOPAUSAL BLEEDING): ICD-10-CM

## 2025-06-16 DIAGNOSIS — F52.0 HYPOACTIVE SEXUAL DESIRE DISORDER: ICD-10-CM

## 2025-06-16 DIAGNOSIS — G47.00 INSOMNIA, UNSPECIFIED TYPE: ICD-10-CM

## 2025-06-16 DIAGNOSIS — C80.1 CANCER (MULTI): ICD-10-CM

## 2025-06-16 DIAGNOSIS — M62.89 PELVIC FLOOR DYSFUNCTION IN FEMALE: Primary | ICD-10-CM

## 2025-06-16 DIAGNOSIS — N95.8 GENITOURINARY SYNDROME OF MENOPAUSE: ICD-10-CM

## 2025-06-16 PROCEDURE — 3008F BODY MASS INDEX DOCD: CPT | Performed by: NURSE PRACTITIONER

## 2025-06-16 PROCEDURE — 1036F TOBACCO NON-USER: CPT | Performed by: NURSE PRACTITIONER

## 2025-06-16 PROCEDURE — 99204 OFFICE O/P NEW MOD 45 MIN: CPT | Performed by: NURSE PRACTITIONER

## 2025-06-16 RX ORDER — FLIBANSERIN 100 MG/1
100 TABLET, FILM COATED ORAL DAILY
Qty: 30 TABLET | Refills: 11 | Status: SHIPPED | OUTPATIENT
Start: 2025-06-16

## 2025-06-16 RX ORDER — AMITRIPTYLINE HYDROCHLORIDE 10 MG/1
10 TABLET, FILM COATED ORAL NIGHTLY
Qty: 30 TABLET | Refills: 3 | Status: SHIPPED | OUTPATIENT
Start: 2025-06-16 | End: 2026-06-16

## 2025-06-16 RX ORDER — ESTRADIOL 0.1 MG/G
CREAM VAGINAL
Qty: 42.5 G | Refills: 3 | Status: SHIPPED | OUTPATIENT
Start: 2025-06-16

## 2025-06-16 ASSESSMENT — ENCOUNTER SYMPTOMS
GASTROINTESTINAL NEGATIVE: 0
RESPIRATORY NEGATIVE: 0
CARDIOVASCULAR NEGATIVE: 0
ALLERGIC/IMMUNOLOGIC NEGATIVE: 0
MUSCULOSKELETAL NEGATIVE: 0
EYES NEGATIVE: 0
ENDOCRINE NEGATIVE: 0
CONSTITUTIONAL NEGATIVE: 0
HEMATOLOGIC/LYMPHATIC NEGATIVE: 0
NEUROLOGICAL NEGATIVE: 0
PSYCHIATRIC NEGATIVE: 0

## 2025-06-16 ASSESSMENT — PAIN SCALES - GENERAL: PAINLEVEL_OUTOF10: 0-NO PAIN

## 2025-06-16 NOTE — PROGRESS NOTES
Subjective   Patient ID: Rosy Benjamin is a 54 y.o. female who presents for Menopause.  HPI  Concerns:     Menopausal age: 53    History of a DVT/PE:  History of HTN:   History of elevated lipids: yes  Tobacco use:   Personal history of breast cancer: estrogen + breast cancer, 2016, tamoxifen for 5 years  FMP 2024, a full year without a period, PMB 5/2025      HT: none  use of OTC remedies: none  Compounded bioidenticals: none    Vaginal bleeding:  yes  VMS: none  Sleep difficulties: yes, long history; treatment: melatonin, trazodone, effexor: none effective  Only tried effexor for 2 weeks, no AE  Mood changes: yes, feels like PMS/cyclical  Joint pain: none  Brain fog/difficulty concentrating: yes  Low energy  Pelvic pain once a month, left side    GSM: yes, treatment: lubricant which is helpful  are you sexually active: yes  dyspareunia: yes, initial insertion  decrease in desire: yes  difficulty reaching orgasm:  no  Urinary Incontinence: OAB         Fractures in menopause: none  Calcium intake: adequate  Vitamin D intake: adequate  Increased risk for osteoporosis: none        FH:   breast cancer:  mother  Pt had genetic cancer testing that was negative  ovarian cancer: none  colon cancer: none  pancreatic cancer: none    Social history:  lives with: daughter and     occupation:  customer experience  Exercise:  yes  weight bearing: yes     Review of Systems    Objective   Physical Exam  Genitourinary:     Cervix: Normal.      Comments: No evidence of vulvar dermatoses or vulvodynia  Pain posterior and lateral pelvic floor, bilateral         Assessment/Plan   Diagnoses and all orders for this visit:  Pelvic floor dysfunction in female  -     Referral to Physical Therapy; Future  Hypoactive sexual desire disorder  -     flibanserin (Addyi) 100 mg tablet; Take 100 mg by mouth once daily. Take at bedtime  Insomnia, unspecified type  -     amitriptyline (Elavil) 10 mg tablet; Take 1 tablet (10 mg) by mouth once  daily at bedtime.  Genitourinary syndrome of menopause  -     estradiol (Estrace) 0.01 % (0.1 mg/gram) vaginal cream; Insert one gram vaginally for 14 days followed by 3 times/week  PMB (postmenopausal bleeding)  -     US PELVIS TRANSABDOMINAL WITH TRANSVAGINAL; Future    Genitourinary syndrome of menopause is very common and undertreated in perimenopausal and menopausal women. It is due to the low levels of estrogen and sex steroids. Common symptoms include: vaginal dryness, pain with sex, frequent urinary tract infections, burning, itching, and irritation. Treatment options can include vaginal moisturizers and lubricants and prescription vaginal estrogen, intravaginal DHEA and oral Osphena. The prescription options have similar efficacy and low risk. However, there are black box warnings on the vaginal estrogen products and Osphena that are based off of studies of systemic HT in the [facility]I study, not studies done on vaginal estrogen or Osphena.    The prescription options may take up to 12 weeks to alleviate symptoms and if discontinued, the symptoms will return.    Pt has chosen Addyi for the treatment of HSDD. Addyi works to improve desire in the brain, it will not effect arousal. AE including drowsiness and dizziness and weight loss. Pt advised to stop drinking alcohol 3 hour prior to taking her dose and to not resume drinking till the next day. If pt consumes 3 or more alcoholic drinks that evening; pt advised to not take her dose of Addyi that evening. Discussed with pt that Addyi is effective with women 50% of the time and she needs to trial it for 12 weeks to determine if it is effective for her.    I will contact her with the results of the pelvic US  Follow up in the office in 12 weeks to discuss GSM, HSDD  She will start with the Addyi and if she continues to have difficulty sleeping she will add in the amitriptyline       ANGELA Daniel-CNP 06/16/25 10:44 AM

## 2025-06-16 NOTE — LETTER
June 16, 2025     DEMETRA Tong  [id number] [address] [facility] [address]    Patient: Rosy Benjamin   YOB: 1970   Date of Visit: 6/16/2025       Dear DEMETRA Tong:    Thank you for referring Rosy Benjamin to me for evaluation. Below are my notes for this consultation.  If you have questions, please do not hesitate to call me. I look forward to following your patient along with you.       Sincerely,     DEMETRA Daniel      CC: No Recipients  ______________________________________________________________________________________    Subjective  Patient ID: Rosy Benjamin is a 54 y.o. female who presents for Menopause.  HPI  Concerns:     Menopausal age: 53    History of a DVT/PE:  History of HTN:   History of elevated lipids: yes  Tobacco use:   Personal history of breast cancer: estrogen + breast cancer, 2016, tamoxifen for 5 years  FMP 2024, a full year without a period, PMB 5/2025      HT: none  use of OTC remedies: none  Compounded bioidenticals: none    Vaginal bleeding:  yes  VMS: none  Sleep difficulties: yes, long history; treatment: melatonin, trazodone, effexor: none effective  Only tried effexor for 2 weeks, no AE  Mood changes: yes, feels like PMS/cyclical  Joint pain: none  Brain fog/difficulty concentrating: yes  Low energy  Pelvic pain once a month, left side    GSM: yes, treatment: lubricant which is helpful  are you sexually active: yes  dyspareunia: yes, initial insertion  decrease in desire: yes  difficulty reaching orgasm:  no  Urinary Incontinence: OAB         Fractures in menopause: none  Calcium intake: adequate  Vitamin D intake: adequate  Increased risk for osteoporosis: none        FH:   breast cancer:  mother  Pt had genetic cancer testing that was negative  ovarian cancer: none  colon cancer: none  pancreatic cancer: none    Social history:  lives with: daughter and     occupation:  customer experience  Exercise:  yes  weight bearing: yes      Review of Systems    Objective  Physical Exam  Genitourinary:     Cervix: Normal.      Comments: No evidence of vulvar dermatoses or vulvodynia  Pain posterior and lateral pelvic floor, bilateral         Assessment/Plan  Diagnoses and all orders for this visit:  Pelvic floor dysfunction in female  -     Referral to Physical Therapy; Future  Hypoactive sexual desire disorder  -     flibanserin (Addyi) 100 mg tablet; Take 100 mg by mouth once daily. Take at bedtime  Insomnia, unspecified type  -     amitriptyline (Elavil) 10 mg tablet; Take 1 tablet (10 mg) by mouth once daily at bedtime.  Genitourinary syndrome of menopause  -     estradiol (Estrace) 0.01 % (0.1 mg/gram) vaginal cream; Insert one gram vaginally for 14 days followed by 3 times/week  PMB (postmenopausal bleeding)  -     US PELVIS TRANSABDOMINAL WITH TRANSVAGINAL; Future    Genitourinary syndrome of menopause is very common and undertreated in perimenopausal and menopausal women. It is due to the low levels of estrogen and sex steroids. Common symptoms include: vaginal dryness, pain with sex, frequent urinary tract infections, burning, itching, and irritation. Treatment options can include vaginal moisturizers and lubricants and prescription vaginal estrogen, intravaginal DHEA and oral Osphena. The prescription options have similar efficacy and low risk. However, there are black box warnings on the vaginal estrogen products and Osphena that are based off of studies of systemic HT in the WHI study, not studies done on vaginal estrogen or Osphena.    The prescription options may take up to 12 weeks to alleviate symptoms and if discontinued, the symptoms will return.    Pt has chosen Addyi for the treatment of HSDD. Addyi works to improve desire in the brain, it will not effect arousal. AE including drowsiness and dizziness and weight loss. Pt advised to stop drinking alcohol 3 hour prior to taking her dose and to not resume drinking till the next day.  If pt consumes 3 or more alcoholic drinks that evening; pt advised to not take her dose of Addyi that evening. Discussed with pt that Addyi is effective with women 50% of the time and she needs to trial it for 12 weeks to determine if it is effective for her.    I will contact her with the results of the pelvic US  Follow up in the office in 12 weeks to discuss GSM, HSDD  She will start with the Addyi and if she continues to have difficulty sleeping she will add in the amitriptyline       DEMETRA Daniel 06/16/25 10:44 AM

## 2025-07-01 ENCOUNTER — HOSPITAL ENCOUNTER (OUTPATIENT)
Dept: RADIOLOGY | Facility: CLINIC | Age: 55
Discharge: HOME | End: 2025-07-01
Payer: COMMERCIAL

## 2025-07-01 DIAGNOSIS — N95.0 PMB (POSTMENOPAUSAL BLEEDING): ICD-10-CM

## 2025-07-01 PROCEDURE — 76830 TRANSVAGINAL US NON-OB: CPT | Performed by: RADIOLOGY

## 2025-07-01 PROCEDURE — 76856 US EXAM PELVIC COMPLETE: CPT | Performed by: RADIOLOGY

## 2025-07-01 PROCEDURE — 76830 TRANSVAGINAL US NON-OB: CPT

## 2025-07-07 ENCOUNTER — TELEPHONE (OUTPATIENT)
Dept: OBSTETRICS AND GYNECOLOGY | Facility: CLINIC | Age: 55
End: 2025-07-07
Payer: COMMERCIAL

## 2025-07-07 NOTE — TELEPHONE ENCOUNTER
Pt verified by name and .  Pt is aware of Milton Wang's message:  She is going to need an EMB please   Pt is aware nurse will call her back with date and time after discussing with Milton Wang.  Pt has no questions at this time.

## 2025-07-09 ENCOUNTER — TELEPHONE (OUTPATIENT)
Dept: OBSTETRICS AND GYNECOLOGY | Facility: CLINIC | Age: 55
End: 2025-07-09
Payer: COMMERCIAL

## 2025-07-09 NOTE — TELEPHONE ENCOUNTER
Pt verified by name and .  Pt is aware she needs an EMB.  Nurse scheduled pt for 2025.  Pt has no questions at this time.

## 2025-07-28 ENCOUNTER — APPOINTMENT (OUTPATIENT)
Dept: RADIOLOGY | Facility: HOSPITAL | Age: 55
End: 2025-07-28
Payer: COMMERCIAL

## 2025-08-11 ENCOUNTER — APPOINTMENT (OUTPATIENT)
Dept: OBSTETRICS AND GYNECOLOGY | Facility: CLINIC | Age: 55
End: 2025-08-11
Payer: COMMERCIAL

## 2025-08-11 VITALS
BODY MASS INDEX: 25.95 KG/M2 | DIASTOLIC BLOOD PRESSURE: 78 MMHG | SYSTOLIC BLOOD PRESSURE: 128 MMHG | HEIGHT: 64 IN | WEIGHT: 152 LBS

## 2025-08-11 DIAGNOSIS — N95.0 PMB (POSTMENOPAUSAL BLEEDING): Primary | ICD-10-CM

## 2025-08-11 DIAGNOSIS — N88.2 CERVICAL STENOSIS (UTERINE CERVIX): ICD-10-CM

## 2025-08-11 ASSESSMENT — PAIN SCALES - GENERAL: PAINLEVEL_OUTOF10: 0-NO PAIN

## 2025-09-09 ENCOUNTER — APPOINTMENT (OUTPATIENT)
Dept: OBSTETRICS AND GYNECOLOGY | Facility: CLINIC | Age: 55
End: 2025-09-09
Payer: COMMERCIAL

## 2026-03-13 ENCOUNTER — APPOINTMENT (OUTPATIENT)
Dept: PRIMARY CARE | Facility: CLINIC | Age: 56
End: 2026-03-13
Payer: COMMERCIAL